# Patient Record
Sex: MALE | Race: OTHER | HISPANIC OR LATINO | Employment: FULL TIME | URBAN - METROPOLITAN AREA
[De-identification: names, ages, dates, MRNs, and addresses within clinical notes are randomized per-mention and may not be internally consistent; named-entity substitution may affect disease eponyms.]

---

## 2017-01-03 ENCOUNTER — NON-PROVIDER VISIT (OUTPATIENT)
Dept: OCCUPATIONAL MEDICINE | Facility: CLINIC | Age: 22
End: 2017-01-03

## 2017-01-03 DIAGNOSIS — Z11.1 ENCOUNTER FOR PPD TEST: ICD-10-CM

## 2017-01-03 PROCEDURE — 86580 TB INTRADERMAL TEST: CPT | Performed by: PREVENTIVE MEDICINE

## 2017-01-06 ENCOUNTER — NON-PROVIDER VISIT (OUTPATIENT)
Dept: OCCUPATIONAL MEDICINE | Facility: CLINIC | Age: 22
End: 2017-01-06

## 2017-01-06 DIAGNOSIS — Z11.1 ENCOUNTER FOR PPD SKIN TEST READING: ICD-10-CM

## 2017-01-06 LAB — TB WHEAL 3D P 5 TU DIAM: NORMAL MM

## 2017-01-11 ENCOUNTER — NON-PROVIDER VISIT (OUTPATIENT)
Dept: OCCUPATIONAL MEDICINE | Facility: CLINIC | Age: 22
End: 2017-01-11

## 2017-01-11 DIAGNOSIS — Z11.1 ENCOUNTER FOR PPD TEST: ICD-10-CM

## 2017-01-11 PROCEDURE — 86580 TB INTRADERMAL TEST: CPT | Performed by: PREVENTIVE MEDICINE

## 2017-01-13 ENCOUNTER — NON-PROVIDER VISIT (OUTPATIENT)
Dept: OCCUPATIONAL MEDICINE | Facility: CLINIC | Age: 22
End: 2017-01-13

## 2017-01-13 DIAGNOSIS — Z11.1 ENCOUNTER FOR PPD SKIN TEST READING: ICD-10-CM

## 2017-01-16 LAB — TB WHEAL 3D P 5 TU DIAM: NORMAL MM

## 2017-12-28 ENCOUNTER — HOSPITAL ENCOUNTER (OUTPATIENT)
Dept: RADIOLOGY | Facility: MEDICAL CENTER | Age: 22
End: 2017-12-28
Attending: ORTHOPAEDIC SURGERY
Payer: MEDICAID

## 2017-12-28 DIAGNOSIS — M25.511 RIGHT SHOULDER PAIN, UNSPECIFIED CHRONICITY: ICD-10-CM

## 2017-12-28 PROCEDURE — 77002 NEEDLE LOCALIZATION BY XRAY: CPT | Mod: RT

## 2017-12-28 PROCEDURE — 73222 MRI JOINT UPR EXTREM W/DYE: CPT | Mod: RT

## 2017-12-28 PROCEDURE — A9579 GAD-BASE MR CONTRAST NOS,1ML: HCPCS | Performed by: ORTHOPAEDIC SURGERY

## 2017-12-28 PROCEDURE — 700117 HCHG RX CONTRAST REV CODE 255: Performed by: ORTHOPAEDIC SURGERY

## 2017-12-28 RX ADMIN — IOHEXOL 5 ML: 300 INJECTION, SOLUTION INTRAVENOUS at 14:11

## 2017-12-28 RX ADMIN — GADODIAMIDE 5 ML: 287 INJECTION INTRAVENOUS at 14:11

## 2019-01-04 ENCOUNTER — NON-PROVIDER VISIT (OUTPATIENT)
Dept: URGENT CARE | Facility: PHYSICIAN GROUP | Age: 24
End: 2019-01-04

## 2019-01-04 DIAGNOSIS — Z02.1 PRE-EMPLOYMENT DRUG SCREENING: ICD-10-CM

## 2019-01-04 LAB
AMP AMPHETAMINE: NORMAL
COC COCAINE: NORMAL
INT CON NEG: NEGATIVE
INT CON POS: POSITIVE
MET METHAMPHETAMINES: NORMAL
OPI OPIATES: NORMAL
PCP PHENCYCLIDINE: NORMAL
POC DRUG COMMENT 753798-OCCUPATIONAL HEALTH: NORMAL
THC: NORMAL

## 2019-01-04 PROCEDURE — 80305 DRUG TEST PRSMV DIR OPT OBS: CPT | Performed by: EMERGENCY MEDICINE

## 2019-05-21 ENCOUNTER — OFFICE VISIT (OUTPATIENT)
Dept: URGENT CARE | Facility: CLINIC | Age: 24
End: 2019-05-21

## 2019-05-21 VITALS
HEIGHT: 66 IN | BODY MASS INDEX: 50.62 KG/M2 | WEIGHT: 315 LBS | DIASTOLIC BLOOD PRESSURE: 70 MMHG | TEMPERATURE: 98.8 F | HEART RATE: 72 BPM | SYSTOLIC BLOOD PRESSURE: 118 MMHG | OXYGEN SATURATION: 98 % | RESPIRATION RATE: 16 BRPM

## 2019-05-21 DIAGNOSIS — Z02.1 PHYSICAL EXAM, PRE-EMPLOYMENT: ICD-10-CM

## 2019-05-21 PROCEDURE — 8907 PR URINE COLLECT ONLY: Performed by: PHYSICIAN ASSISTANT

## 2019-05-21 PROCEDURE — 8915 PR COMPREHENSIVE PHYSICAL: Performed by: PHYSICIAN ASSISTANT

## 2019-08-06 ENCOUNTER — NON-PROVIDER VISIT (OUTPATIENT)
Dept: URGENT CARE | Facility: PHYSICIAN GROUP | Age: 24
End: 2019-08-06

## 2019-08-06 DIAGNOSIS — Z02.1 PRE-EMPLOYMENT DRUG SCREENING: ICD-10-CM

## 2019-08-06 LAB
AMP AMPHETAMINE: NORMAL
COC COCAINE: NORMAL
INT CON NEG: NORMAL
INT CON POS: NORMAL
MET METHAMPHETAMINES: NORMAL
OPI OPIATES: NORMAL
PCP PHENCYCLIDINE: NORMAL
POC DRUG COMMENT 753798-OCCUPATIONAL HEALTH: NEGATIVE
THC: NORMAL

## 2019-08-06 PROCEDURE — 80305 DRUG TEST PRSMV DIR OPT OBS: CPT | Performed by: NURSE PRACTITIONER

## 2019-09-19 ENCOUNTER — OFFICE VISIT (OUTPATIENT)
Dept: URGENT CARE | Facility: PHYSICIAN GROUP | Age: 24
End: 2019-09-19
Payer: COMMERCIAL

## 2019-09-19 ENCOUNTER — HOSPITAL ENCOUNTER (OUTPATIENT)
Dept: RADIOLOGY | Facility: MEDICAL CENTER | Age: 24
End: 2019-09-19
Attending: PHYSICIAN ASSISTANT
Payer: COMMERCIAL

## 2019-09-19 VITALS
HEART RATE: 84 BPM | TEMPERATURE: 97.8 F | OXYGEN SATURATION: 94 % | DIASTOLIC BLOOD PRESSURE: 80 MMHG | BODY MASS INDEX: 45.91 KG/M2 | RESPIRATION RATE: 12 BRPM | WEIGHT: 310 LBS | SYSTOLIC BLOOD PRESSURE: 132 MMHG | HEIGHT: 69 IN

## 2019-09-19 DIAGNOSIS — E66.01 MORBID OBESITY WITH BMI OF 45.0-49.9, ADULT (HCC): ICD-10-CM

## 2019-09-19 DIAGNOSIS — M25.572 CHRONIC PAIN OF LEFT ANKLE: ICD-10-CM

## 2019-09-19 DIAGNOSIS — M72.2 PLANTAR FASCIITIS: ICD-10-CM

## 2019-09-19 DIAGNOSIS — G89.29 CHRONIC PAIN OF LEFT ANKLE: ICD-10-CM

## 2019-09-19 PROCEDURE — 99214 OFFICE O/P EST MOD 30 MIN: CPT | Performed by: PHYSICIAN ASSISTANT

## 2019-09-19 PROCEDURE — 73610 X-RAY EXAM OF ANKLE: CPT | Mod: LT

## 2019-09-19 ASSESSMENT — ENCOUNTER SYMPTOMS
FALLS: 0
NAUSEA: 0
FEVER: 0
MUSCLE WEAKNESS: 0
INABILITY TO BEAR WEIGHT: 0
TINGLING: 0
LOSS OF SENSATION: 0
NUMBNESS: 0
CHILLS: 0
VOMITING: 0
LOSS OF MOTION: 0

## 2019-09-19 NOTE — PROGRESS NOTES
Subjective:   Rohan Overton is a 23 y.o. male who presents for Ankle Pain (been going for a year )        This is a new problem.  Patient complains of a right inner ankle pain x1.5 years.  Symptoms began after he was kicked directly in this area during a soccer game.  He states that initially symptoms improved, but never completely resolved.  He is also experiencing some pain on the bottom of his foot.  The pain is aching and does not radiate.  Work symptoms are worse in the morning and improves throughout the day, but then worsened into the evening.  He is taking ibuprofen regularly for symptoms and states this is providing him with significant relief.  He has never had this problem evaluated.  He continues to play soccer regularly. Denies personal hx of diabetes.    Ankle Pain    Incident onset: 1.5 years ago. The incident occurred at the gym (playing soccer). The injury mechanism was a direct blow. The pain is present in the left ankle (inside). The quality of the pain is described as aching. The pain is moderate. The pain has been fluctuating since onset. Pertinent negatives include no inability to bear weight, loss of motion, loss of sensation, muscle weakness, numbness or tingling. He reports no foreign bodies present. The symptoms are aggravated by movement and weight bearing. He has tried rest, ice, elevation and NSAIDs for the symptoms. The treatment provided moderate relief.     Review of Systems   Constitutional: Negative for chills and fever.   Gastrointestinal: Negative for nausea and vomiting.   Musculoskeletal: Positive for joint pain. Negative for falls.   Neurological: Negative for tingling and numbness.       PMH:  has a past medical history of ASTHMA.  MEDS:   Current Outpatient Medications:   •  benzonatate (TESSALON) 200 MG capsule, Take 200 mg by mouth 3 times a day as needed. Indications: Cough, Disp: , Rfl:   •  fluticasone (FLONASE) 50 MCG/ACT nasal spray, Spray 2 Sprays in nose every day.  "Each Nostril   Indications: Nonallergic Rhinitis, Disp: , Rfl:   •  ibuprofen (MOTRIN) 200 MG TABS, Take 200 mg by mouth every 6 hours as needed. Indications: Mild to Moderate Pain, Disp: , Rfl:   •  albuterol (VENTOLIN OR PROVENTIL) 108 (90 BASE) MCG/ACT AERS, Inhale 2 Puffs by mouth every 6 hours as needed. Indications: Asthma, Disp: , Rfl:   ALLERGIES: No Known Allergies  SURGHX: History reviewed. No pertinent surgical history.  SOCHX:  reports that he has never smoked. He has never used smokeless tobacco. He reports that he does not drink alcohol or use drugs.  FH: Family history was reviewed, no pertinent findings to report   Objective:   /80   Pulse 84   Temp 36.6 °C (97.8 °F) (Temporal)   Resp 12   Ht 1.753 m (5' 9\")   Wt (!) 140.6 kg (310 lb)   SpO2 94%   BMI 45.78 kg/m²   Physical Exam   Constitutional: He is oriented to person, place, and time. He appears well-developed and well-nourished.  Non-toxic appearance. No distress.   HENT:   Head: Normocephalic and atraumatic.   Right Ear: External ear normal.   Left Ear: External ear normal.   Nose: Nose normal.   Eyes: Conjunctivae and lids are normal.   Neck: Neck supple.   Cardiovascular: Normal rate and regular rhythm.   Pulmonary/Chest: Effort normal and breath sounds normal. No respiratory distress.   Abdominal: Normal appearance.   Musculoskeletal:   Left ankle/foot:  Appearance - No bruising, erythema, or deformity appreciated  Palpation -mildly tender over deltoid ligament and plantar fascia.  No TTP along medial malleolus. No TTP of lateral malleolus, ATFL, CFL, or PTFL.  No TTP along midfoot, base of the 5th metatarsal, MTP joints, or toes.   ROM - FROM throughout  Strength - 5/5 throughout  Special testing - Neg squeeze test, neg drawer test  Neurovascular - 2+ dorsalis pedis and posterior tibial.  Sensation intact and equal bilaterally       Neurological: He is alert and oriented to person, place, and time. No cranial nerve deficit or " sensory deficit.   Skin: Skin is warm, dry and intact. Capillary refill takes less than 2 seconds.   Psychiatric: He has a normal mood and affect. His speech is normal and behavior is normal. Judgment and thought content normal. Cognition and memory are normal.   Vitals reviewed.        Assessment/Plan:   1. Chronic pain of left ankle  - DX-ANKLE 3+ VIEWS LEFT; Future  - REFERRAL TO SPORTS MEDICINE    2. Plantar fasciitis  - REFERRAL TO SPORTS MEDICINE    3. Morbid obesity with BMI of 45.0-49.9, adult (HCC)    I suspect that some of patient's symptoms are related to plantar fasciitis however medial ankle pain likely due to different etiology.  Patient is also morbidly obese, further exacerbating symptoms.  I will obtain an x-ray to rule out bony abnormality.  Patient to follow-up with sports medicine for further evaluation and treatment as indicated.  I would like patient to roll out to the bottom of his foot on a lacrosse in the morning and in the evening.  Additionally I would like him to obtain inserts that will provide more arch support.  May continue to take ibuprofen as needed.    Differential diagnosis, natural history, supportive care, and indications for immediate follow-up discussed.

## 2021-12-27 ENCOUNTER — OFFICE VISIT (OUTPATIENT)
Dept: URGENT CARE | Facility: PHYSICIAN GROUP | Age: 26
End: 2021-12-27
Payer: OTHER GOVERNMENT

## 2021-12-27 ENCOUNTER — HOSPITAL ENCOUNTER (OUTPATIENT)
Facility: MEDICAL CENTER | Age: 26
End: 2021-12-27
Attending: PHYSICIAN ASSISTANT
Payer: OTHER GOVERNMENT

## 2021-12-27 VITALS
BODY MASS INDEX: 48.65 KG/M2 | TEMPERATURE: 99.2 F | HEART RATE: 100 BPM | RESPIRATION RATE: 20 BRPM | DIASTOLIC BLOOD PRESSURE: 80 MMHG | HEIGHT: 67 IN | OXYGEN SATURATION: 95 % | SYSTOLIC BLOOD PRESSURE: 150 MMHG | WEIGHT: 310 LBS

## 2021-12-27 DIAGNOSIS — Z20.822 CLOSE EXPOSURE TO COVID-19 VIRUS: ICD-10-CM

## 2021-12-27 DIAGNOSIS — Z20.822 SUSPECTED COVID-19 VIRUS INFECTION: ICD-10-CM

## 2021-12-27 DIAGNOSIS — R03.0 ELEVATED BLOOD PRESSURE READING: ICD-10-CM

## 2021-12-27 PROCEDURE — U0005 INFEC AGEN DETEC AMPLI PROBE: HCPCS

## 2021-12-27 PROCEDURE — 99213 OFFICE O/P EST LOW 20 MIN: CPT | Mod: CS | Performed by: PHYSICIAN ASSISTANT

## 2021-12-27 PROCEDURE — U0003 INFECTIOUS AGENT DETECTION BY NUCLEIC ACID (DNA OR RNA); SEVERE ACUTE RESPIRATORY SYNDROME CORONAVIRUS 2 (SARS-COV-2) (CORONAVIRUS DISEASE [COVID-19]), AMPLIFIED PROBE TECHNIQUE, MAKING USE OF HIGH THROUGHPUT TECHNOLOGIES AS DESCRIBED BY CMS-2020-01-R: HCPCS

## 2021-12-27 ASSESSMENT — ENCOUNTER SYMPTOMS
DIZZINESS: 0
CHILLS: 1
COUGH: 1
EYE REDNESS: 0
ABDOMINAL PAIN: 0
WHEEZING: 0
MYALGIAS: 1
HEADACHES: 1
VOMITING: 1
EYE DISCHARGE: 0
NECK PAIN: 0
NAUSEA: 1
FEVER: 0
DIARRHEA: 1
SORE THROAT: 1

## 2021-12-28 DIAGNOSIS — Z20.822 SUSPECTED COVID-19 VIRUS INFECTION: ICD-10-CM

## 2021-12-28 LAB — COVID ORDER STATUS COVID19: NORMAL

## 2021-12-28 NOTE — PROGRESS NOTES
"Subjective     Rohan Overton is a 26 y.o. male who presents with Nausea (vomiting, headache, fever, loss of [id number] )            Patient is a 26-year-old male who presents to urgent care with nausea, vomiting, body aches subjective fevers, headache and loss of smell that began yesterday.  Patient reports that his roommate tested positive last night for COVID-19 of which she has had significant exposure.  Patient is immunized to COVID x2.  Specifically denies any shortness of breath, difficulty breathing.  He does report a slight dry cough.  He has not taken anything for symptoms.  He denies any chest tightness as he does have history of asthma.    Nausea  Associated symptoms include chills, congestion, coughing, headaches, myalgias, nausea, a sore throat and vomiting. Pertinent negatives include no abdominal pain, fever, neck pain or rash.       Review of Systems   Constitutional: Positive for chills and malaise/fatigue. Negative for fever.   HENT: Positive for congestion and sore throat. Negative for ear discharge and ear pain.    Eyes: Negative for discharge and redness.   Respiratory: Positive for cough. Negative for wheezing.    Gastrointestinal: Positive for diarrhea, nausea and vomiting. Negative for abdominal pain.   Genitourinary: Negative for dysuria and urgency.   Musculoskeletal: Positive for myalgias. Negative for neck pain.   Skin: Negative for itching and rash.   Neurological: Positive for headaches. Negative for dizziness.              Objective     /80   Pulse 100   Temp 37.3 °C (99.2 °F) (Temporal)   Resp 20   Ht 1.702 m (5' 7\")   Wt (!) 141 kg (310 lb)   SpO2 95%   BMI 48.55 kg/m²    PMH:  has a past medical history of ASTHMA.  MEDS: Reviewed .   ALLERGIES: No Known Allergies  SURGHX: No past surgical history on file.  SOCHX:  reports that he has never smoked. He has never used smokeless tobacco. He reports current alcohol use. He reports that he does not use drugs.  FH: Family " history was reviewed, no pertinent findings to report    Physical Exam  Vitals reviewed.   Constitutional:       General: He is not in acute distress.     Appearance: He is well-developed.   HENT:      Head: Normocephalic and atraumatic.      Right Ear: External ear normal.      Left Ear: External ear normal.      Nose: No congestion.      Mouth/Throat:      Pharynx: No posterior oropharyngeal erythema.   Eyes:      Conjunctiva/sclera: Conjunctivae normal.      Pupils: Pupils are equal, round, and reactive to light.   Neck:      Trachea: No tracheal deviation.   Cardiovascular:      Rate and Rhythm: Normal rate and regular rhythm.   Pulmonary:      Effort: Pulmonary effort is normal.      Breath sounds: Normal breath sounds.   Musculoskeletal:         General: Normal range of motion.      Cervical back: Normal range of motion and neck supple.   Skin:     General: Skin is warm.      Findings: No rash.      Comments: No rash to area exposed during the visit today.    Neurological:      Mental Status: He is alert and oriented to person, place, and time.      Coordination: Coordination normal.   Psychiatric:         Behavior: Behavior normal.         Thought Content: Thought content normal.         Judgment: Judgment normal.                             Assessment & Plan        1. Suspected COVID-19 virus infection  COVID/SARS CoV-2 PCR   2. Close exposure to COVID-19 virus     3. Elevated blood pressure reading                 Appropriate PPE worn at all times by provider.   Pt. Had face mask on throughout entirety of the visit other than oropharyngeal examination today.   Patient is also with elevated blood pressure in clinic today-strongly encourage patient to follow-up with his PCP for further recheck in the future.    Testing performed for COVID-19.    Patient currently without indication of need for higher level of care.    Reviewed with patient/guardian that if they do test positive they will be contacted by their  local health department regarding return to work/school protocols.  Results will also be [facility]guardian [facility] or called to the patient/guardian directly. Discussed isolation/quarantine recommendations.  Encouraged mask use, frequent handwashing, wiping down hard surfaces, etc.    Patient and/or guardian given precautionary s/sx that mandate immediate follow up and evaluation in the ED. Advised of risks of not doing so.  Side effects of OTC or prescribed medications discussed.   DDX, Supportive care, and indications for immediate follow-up discussed.  Instructed to return to clinic or nearest emergency department if we are not available for any change in condition, further concerns, or worsening of symptoms.    The patient and/or guardian demonstrated a good understanding and agreed with the treatment plan.    Please note that this dictation was created using voice recognition software. I have made every reasonable attempt to correct obvious errors, but I expect that there are errors of grammar and possibly content that I did not discover before finalizing the note.

## 2021-12-29 LAB
SARS-COV-2 RNA RESP QL NAA+PROBE: DETECTED
SPECIMEN SOURCE: ABNORMAL

## 2025-01-06 ENCOUNTER — OFFICE VISIT (OUTPATIENT)
Dept: MEDICAL GROUP | Facility: PHYSICIAN GROUP | Age: 30
End: 2025-01-06
Payer: COMMERCIAL

## 2025-01-06 VITALS
OXYGEN SATURATION: 91 % | HEIGHT: 68 IN | DIASTOLIC BLOOD PRESSURE: 78 MMHG | SYSTOLIC BLOOD PRESSURE: 108 MMHG | WEIGHT: 315 LBS | BODY MASS INDEX: 47.74 KG/M2 | HEART RATE: 78 BPM

## 2025-01-06 DIAGNOSIS — G47.33 OSA (OBSTRUCTIVE SLEEP APNEA): ICD-10-CM

## 2025-01-06 DIAGNOSIS — F19.11 SUBSTANCE ABUSE IN REMISSION (HCC): ICD-10-CM

## 2025-01-06 DIAGNOSIS — Z76.89 ENCOUNTER TO ESTABLISH CARE: ICD-10-CM

## 2025-01-06 DIAGNOSIS — E66.01 MORBID OBESITY WITH BMI OF 50.0-59.9, ADULT (HCC): ICD-10-CM

## 2025-01-06 DIAGNOSIS — J45.20 MILD INTERMITTENT ASTHMA WITHOUT COMPLICATION: ICD-10-CM

## 2025-01-06 PROBLEM — G51.0 RIGHT-SIDED BELL'S PALSY: Status: ACTIVE | Noted: 2025-01-06

## 2025-01-06 PROBLEM — M72.2 PLANTAR FASCIITIS: Status: ACTIVE | Noted: 2025-01-06

## 2025-01-06 PROBLEM — Z86.69 HISTORY OF BELL'S PALSY: Status: ACTIVE | Noted: 2025-01-06

## 2025-01-06 PROCEDURE — 3074F SYST BP LT 130 MM HG: CPT | Performed by: STUDENT IN AN ORGANIZED HEALTH CARE EDUCATION/TRAINING PROGRAM

## 2025-01-06 PROCEDURE — 3078F DIAST BP <80 MM HG: CPT | Performed by: STUDENT IN AN ORGANIZED HEALTH CARE EDUCATION/TRAINING PROGRAM

## 2025-01-06 PROCEDURE — 99204 OFFICE O/P NEW MOD 45 MIN: CPT | Performed by: STUDENT IN AN ORGANIZED HEALTH CARE EDUCATION/TRAINING PROGRAM

## 2025-01-06 SDOH — ECONOMIC STABILITY: FOOD INSECURITY: WITHIN THE PAST 12 MONTHS, THE FOOD YOU BOUGHT JUST DIDN'T LAST AND YOU DIDN'T HAVE MONEY TO GET MORE.: NEVER TRUE

## 2025-01-06 SDOH — HEALTH STABILITY: PHYSICAL HEALTH: ON AVERAGE, HOW MANY DAYS PER WEEK DO YOU ENGAGE IN MODERATE TO STRENUOUS EXERCISE (LIKE A BRISK WALK)?: 3 DAYS

## 2025-01-06 SDOH — ECONOMIC STABILITY: INCOME INSECURITY: IN THE LAST 12 MONTHS, WAS THERE A TIME WHEN YOU WERE NOT ABLE TO PAY THE MORTGAGE OR RENT ON TIME?: NO

## 2025-01-06 SDOH — HEALTH STABILITY: PHYSICAL HEALTH: ON AVERAGE, HOW MANY MINUTES DO YOU ENGAGE IN EXERCISE AT THIS LEVEL?: 30 MIN

## 2025-01-06 SDOH — ECONOMIC STABILITY: INCOME INSECURITY: HOW HARD IS IT FOR YOU TO PAY FOR THE VERY BASICS LIKE FOOD, HOUSING, MEDICAL CARE, AND HEATING?: NOT VERY HARD

## 2025-01-06 SDOH — ECONOMIC STABILITY: HOUSING INSECURITY
IN THE LAST 12 MONTHS, WAS THERE A TIME WHEN YOU DID NOT HAVE A STEADY PLACE TO SLEEP OR SLEPT IN A SHELTER (INCLUDING NOW)?: NO

## 2025-01-06 SDOH — HEALTH STABILITY: MENTAL HEALTH
STRESS IS WHEN SOMEONE FEELS TENSE, NERVOUS, ANXIOUS, OR CAN'T SLEEP AT NIGHT BECAUSE THEIR MIND IS TROUBLED. HOW STRESSED ARE YOU?: TO SOME EXTENT

## 2025-01-06 SDOH — ECONOMIC STABILITY: FOOD INSECURITY: WITHIN THE PAST 12 MONTHS, YOU WORRIED THAT YOUR FOOD WOULD RUN OUT BEFORE YOU GOT MONEY TO BUY MORE.: SOMETIMES TRUE

## 2025-01-06 SDOH — ECONOMIC STABILITY: TRANSPORTATION INSECURITY
IN THE PAST 12 MONTHS, HAS LACK OF TRANSPORTATION KEPT YOU FROM MEETINGS, WORK, OR FROM GETTING THINGS NEEDED FOR DAILY LIVING?: NO

## 2025-01-06 SDOH — ECONOMIC STABILITY: TRANSPORTATION INSECURITY
IN THE PAST 12 MONTHS, HAS LACK OF RELIABLE TRANSPORTATION KEPT YOU FROM MEDICAL APPOINTMENTS, MEETINGS, WORK OR FROM GETTING THINGS NEEDED FOR DAILY LIVING?: NO

## 2025-01-06 SDOH — ECONOMIC STABILITY: TRANSPORTATION INSECURITY
IN THE PAST 12 MONTHS, HAS THE LACK OF TRANSPORTATION KEPT YOU FROM MEDICAL APPOINTMENTS OR FROM GETTING MEDICATIONS?: NO

## 2025-01-06 ASSESSMENT — LIFESTYLE VARIABLES
SKIP TO QUESTIONS 9-10: 0
HOW OFTEN DO YOU HAVE A DRINK CONTAINING ALCOHOL: 2-3 TIMES A WEEK
HOW OFTEN DO YOU HAVE SIX OR MORE DRINKS ON ONE OCCASION: WEEKLY
AUDIT-C TOTAL SCORE: 9
HOW MANY STANDARD DRINKS CONTAINING ALCOHOL DO YOU HAVE ON A TYPICAL DAY: 7 TO 9

## 2025-01-06 ASSESSMENT — SOCIAL DETERMINANTS OF HEALTH (SDOH)
DO YOU BELONG TO ANY CLUBS OR ORGANIZATIONS SUCH AS CHURCH GROUPS UNIONS, FRATERNAL OR ATHLETIC GROUPS, OR SCHOOL GROUPS?: NO
HOW OFTEN DO YOU ATTEND CHURCH OR RELIGIOUS SERVICES?: MORE THAN 4 TIMES PER YEAR
HOW MANY DRINKS CONTAINING ALCOHOL DO YOU HAVE ON A TYPICAL DAY WHEN YOU ARE DRINKING: 7 TO 9
HOW OFTEN DO YOU HAVE A DRINK CONTAINING ALCOHOL: 2-3 TIMES A WEEK
HOW OFTEN DO YOU GET TOGETHER WITH FRIENDS OR RELATIVES?: ONCE A WEEK
HOW OFTEN DO YOU ATTENT MEETINGS OF THE CLUB OR ORGANIZATION YOU BELONG TO?: NEVER
IN A TYPICAL WEEK, HOW MANY TIMES DO YOU TALK ON THE PHONE WITH FAMILY, FRIENDS, OR NEIGHBORS?: MORE THAN THREE TIMES A WEEK
IN THE PAST 12 MONTHS, HAS THE ELECTRIC, GAS, OIL, OR WATER COMPANY THREATENED TO SHUT OFF SERVICE IN YOUR HOME?: NO
DO YOU BELONG TO ANY CLUBS OR ORGANIZATIONS SUCH AS CHURCH GROUPS UNIONS, FRATERNAL OR ATHLETIC GROUPS, OR SCHOOL GROUPS?: NO
HOW OFTEN DO YOU GET TOGETHER WITH FRIENDS OR RELATIVES?: ONCE A WEEK
HOW HARD IS IT FOR YOU TO PAY FOR THE VERY BASICS LIKE FOOD, HOUSING, MEDICAL CARE, AND HEATING?: NOT VERY HARD
HOW OFTEN DO YOU ATTENT MEETINGS OF THE CLUB OR ORGANIZATION YOU BELONG TO?: NEVER
HOW OFTEN DO YOU HAVE SIX OR MORE DRINKS ON ONE OCCASION: WEEKLY
WITHIN THE PAST 12 MONTHS, YOU WORRIED THAT YOUR FOOD WOULD RUN OUT BEFORE YOU GOT THE MONEY TO BUY MORE: SOMETIMES TRUE
IN A TYPICAL WEEK, HOW MANY TIMES DO YOU TALK ON THE PHONE WITH FAMILY, FRIENDS, OR NEIGHBORS?: MORE THAN THREE TIMES A WEEK
HOW OFTEN DO YOU ATTEND CHURCH OR RELIGIOUS SERVICES?: MORE THAN 4 TIMES PER YEAR

## 2025-01-06 ASSESSMENT — PATIENT HEALTH QUESTIONNAIRE - PHQ9: CLINICAL INTERPRETATION OF PHQ2 SCORE: 0

## 2025-01-06 NOTE — PROGRESS NOTES
Subjective:     CC:  establish care    History of Present Illness  The patient presents to establish care. Prior PCP was Dr. Mason ([facility]).    He was previously under the care of Dr. Mason at [facility], with his last consultation occurring in May 2024. He has a history of asthma since childhood, which is currently managed with albuterol as needed. He experienced a recent asthma flare-up triggered by cold weather but managed it with albuterol. He has been experiencing a lung problem for the past 2 to 3 years, characterized by a persistent cough that lasts for 2 to 3 weeks and worsens when he is sick. He has a history of COVID-19 infection, having contracted the virus four times.    He has no surgical history. His last blood work was conducted approximately nine months ago, during which he was screened for diabetes and cholesterol levels. He has a known allergy to IBUPROFEN, which causes anaphylaxis.    He has a history of Bell's palsy, which occurred once in 2019, resulting in a complete droop of the right side of his face. He was diagnosed with Bell's palsy at the ER.    He has a history of weight struggles and has tried diets and protein shakes, which provided temporary relief. He tries to exercise regularly, including walking and lifting at work, and swimming and weightlifting at the gym once a week. He has a family history of obesity and is concerned about his weight and diabetes. He has been told that he snores, feels tired during the day, and his wife has witnessed him stop breathing while sleeping. He is requesting a sleep study.    STOPBANG - Sleep Apnea Screening      Flowsheet Row Most Recent Value   S - Have you been told that you SNORE? Yes   T - Are you often TIRED during the day? Yes   O - Do you know if you stop breathing or has anyone witnessed you stop breathing while you were asleep? (OBSTRUCTION) Yes   P - Do you have high blood PRESSURE or on medication to control high blood  pressure? No   B - Is your Body Mass Index greater than 35? (BMI) Yes   A - Are you 50 years old or older? (AGE) No   N - Are you a male with a NECK circumference greater than 17 inches, or a female with a neck circumference greater than 16 inches? Yes  [20 inches]   G - Are you male? (GENDER) Yes   STOPBANG Total Score 6   DEACON Risk High Risk          Supplemental Information  His most recent consultation with Dr. Mason was for foot pain, which was diagnosed as plantar fasciitis. He was advised that the condition would resolve spontaneously and was recommended to use inserts, which he found ineffective. He was also advised to perform exercises using a bottle. The foot pain has since subsided.    SOCIAL HISTORY  The patient vapes nicotine some days. He quit smoking cigarettes in 2018 after smoking nearly 2 packs a day for about 2 years. He consumes alcohol on the weekends, drinking either 24 cans of beer or eight shots of liquor from Friday to Sunday. He has used cocaine and weed in the past, with the last use being 4 to 5 years ago. He reports no i.v. drug use. He is currently sexually active and . He works as a . He has no children. He has completed some college education. He lives with his parents and wife. He has a dog.    FAMILY HISTORY  The patient's mother and father have diabetes. His mother also has high blood pressure. His maternal grandmother and paternal grandfather have high blood pressure. The patient reports a family history of Bell's palsy. There is no family history of cancer, strokes, heart disease, heart attacks, or stents.    ALLERGIES  The patient is allergic to IBUPROFEN, resulting in anaphylaxis.    MEDICATIONS  Current: albuterol    IMMUNIZATIONS  The patient declined the influenza vaccine.      Health Maintenance: Completed    Allergies   Allergen Reactions    Ibuprofen Anaphylaxis, Hives, Rash, Shortness of Breath and Swelling     Patient Active Problem  List   Diagnosis    Morbid obesity with BMI of 50.0-59.9, adult (HCC)    Mild intermittent asthma without complication    History of Bell's palsy    Substance abuse in remission ([facility])    Plantar fasciitis     Current Outpatient Medications   Medication Sig Dispense Refill    albuterol (VENTOLIN OR PROVENTIL) 108 (90 BASE) MCG/ACT AERS Inhale 2 Puffs by mouth every 6 hours as needed. Indications: Asthma       No current facility-administered medications for this visit.     History reviewed. No pertinent surgical history.   Social History     Socioeconomic History    Marital status:      Spouse name: Not on file    Number of children: 0    Years of education: Not on file    Highest education level: Some college, no degree   Occupational History    Occupation:  [employer]   Tobacco Use    Smoking status: Former     Types: Cigarettes    Smokeless tobacco: Never    Tobacco comments:     2 ppd for 2 years, quit 2018   Vaping Use    Vaping status: Some Days    Substances: Nicotine   Substance and Sexual Activity    Alcohol use: Yes     Alcohol/week: 19.2 oz     Types: 24 Cans of beer, 8 Shots of liquor per week     Comment: weekend use    Drug use: Not Currently     Types: Cocaine, Marijuana     Comment: last use 5 years ago    Sexual activity: Yes     Partners: Female     Birth control/protection: I.U.D.   Other Topics Concern    Not on file   Social History Narrative    Lives with wife and parents, 1 dog.     Social Drivers of Health     Financial Resource Strain: Low Risk  (1/6/2025)    Overall Financial Resource Strain (CARDIA)     Difficulty of Paying Living Expenses: Not very hard   Food Insecurity: Food Insecurity Present (1/6/2025)    Hunger Vital Sign     Worried About Running Out of Food in the Last Year: Sometimes true     Ran Out of Food in the Last Year: Never true   Transportation Needs: No Transportation Needs (1/6/2025)    PRAPARE - Transportation     Lack of  Transportation (Medical): No     Lack of Transportation (Non-Medical): No   Physical Activity: Insufficiently Active (1/6/2025)    Exercise Vital Sign     Days of Exercise per Week: 3 days     Minutes of Exercise per Session: 30 min   Stress: Stress Concern Present (1/6/2025)    [employer] [place] [employer] - Occupational Stress Questionnaire     Feeling of Stress : To some extent   Social Connections: Moderately Integrated (1/6/2025)    Social Connection and Isolation Panel [NHANES]     Frequency of Communication with Friends and Family: More than three times a week     Frequency of Social Gatherings with Friends and Family: Once a week     Attends [employer] Services: More than 4 times per year     Active Member of Clubs or Organizations: No     Attends Club or Organization Meetings: Never     Marital Status:    Intimate Partner Violence: Not on file   Housing Stability: Low Risk  (1/6/2025)    Housing Stability Vital Sign     Unable to Pay for Housing in the Last Year: No     Number of Times Moved in the Last Year: 0     Homeless in the Last Year: No     Family History   Problem Relation Age of Onset    Diabetes Mother     Hypertension Mother     Diabetes Father     Diabetes Maternal Grandmother     Hypertension Paternal Grandfather     Diabetes Paternal Grandfather     Cancer Neg Hx     Ovarian Cancer Neg Hx     Tubal Cancer Neg Hx     Peritoneal Cancer Neg Hx     Colorectal Cancer Neg Hx     Breast Cancer Neg Hx     Stroke Neg Hx     Heart Disease Neg Hx          ROS:     Constitutional:  Negative for chills, fever, fatigue, weight loss.  HEENT:  Negative for blurred vision, hearing loss, sore throat.    Respiratory:  Negative for cough, sputum production and shortness of breath.  Cardiovascular:  Negative for chest pain, palpitations and leg swelling.  Gastrointestinal:  Negative for abdominal pain, blood in stool, constipation, diarrhea and vomiting.   Musculoskeletal:  Negative for back  "pain, falls, joint pain and neck pain.   Skin:  Negative for rash.   Neurological:  Negative for dizziness, seizures, weakness and headaches.   Endo/Heme/Allergies:  Does not bruise/bleed easily.   Psychiatric/Behavioral:  Negative for depression, anxiety and suicidal thoughts.      Objective:     Exam: /78 (BP Location: Left arm, Patient Position: Sitting, BP Cuff Size: Large adult)   Pulse 78   Ht 1.727 m (5' 8\")   Wt (!) 172 kg (378 lb 8.5 oz)   SpO2 91%  Body mass index is 57.56 kg/m².    Gen: Alert and oriented, no acute distress.  Eyes:  PERRL, conjunctivae clear, lids normal.   ENMT: Lips without lesions, good dentition, moist mucous membranes.  Neck: Neck is supple, trachea middle, no thyromegaly.  Lungs: Normal effort, CTAB, no wheezing / rhonchi / rales.  CV: RRR, normal S1 and S2, no murmurs.  GI:  Abdomen soft, non-tender, non-distended with normal bowel sounds.  MSK:  Normal ROM.  Ext: No clubbing, cyanosis, or edema.  Skin:  Warm and dry with no rashes or lesions.  Neuro: AAO x 3, no acute focal deficits.  Psych: Normal affect and mood.      Assessment & Plan:   29 y.o. male with the following -    1. Encounter to establish care  Patient presents today to establish care.  Chart was reviewed and history was discussed in detail with the patient.  Records requested from previous PCP.  He reports labs were last done 9 months ago including screening for diabetes and cholesterol.  Annual labs will be ordered accordingly after records are obtained.    2. Mild intermittent asthma without complication  Chronic, stable.  Episodes of cough that lasted a couple weeks are most likely due to viral illness.  Continue albuterol as needed.    3. DEACON (obstructive sleep apnea)  Chronic, uncontrolled.  STOP-BANG score 6 (high risk) and sleep study ordered.  - Overnight Home Sleep Study; Future    4. Morbid obesity with BMI of 50.0-59.9, adult (HCC)  Chronic, uncontrolled.  BMI 57.56 today.  He reports labs were " done in the past including screening for diabetes and cholesterol which were normal.  He most likely has sleep apnea and sleep study ordered.  We discussed the importance of healthy diet and 150 minutes of exercise weekly.  He will inquire with insurance regarding coverage of weight loss medication.  We discussed bariatric surgery but he declined at this time.  - Overnight Home Sleep Study; Future    5. Substance abuse in remission (HCC)  Chronic, in remission.  Last cocaine use was 4-5 years ago and encouraged to continue abstaining from illicit drug use.        I spent a total of 30 minutes with record review, exam, communication with the patient, communication with other providers, and documentation of this encounter.    Return in about 3 months (around 4/6/2025) for Follow-up of chronic conditions.    Verbal consent was acquired by the patient to use Headspace ambient listening note generation during this visit: Yes.    Please note that this dictation was created using voice recognition software. I have made every reasonable attempt to correct obvious errors, but I expect that there are errors of grammar and possibly content that I did not discover before finalizing the note.

## 2025-02-06 ENCOUNTER — TELEPHONE (OUTPATIENT)
Dept: HEALTH INFORMATION MANAGEMENT | Facility: OTHER | Age: 30
End: 2025-02-06
Payer: COMMERCIAL

## 2025-02-14 ENCOUNTER — HOME STUDY (OUTPATIENT)
Dept: SLEEP MEDICINE | Facility: MEDICAL CENTER | Age: 30
End: 2025-02-14
Attending: STUDENT IN AN ORGANIZED HEALTH CARE EDUCATION/TRAINING PROGRAM
Payer: COMMERCIAL

## 2025-02-14 DIAGNOSIS — E66.01 MORBID OBESITY WITH BMI OF 50.0-59.9, ADULT (HCC): ICD-10-CM

## 2025-02-14 DIAGNOSIS — G47.33 OSA (OBSTRUCTIVE SLEEP APNEA): ICD-10-CM

## 2025-02-15 PROCEDURE — 95800 SLP STDY UNATTENDED: CPT | Performed by: STUDENT IN AN ORGANIZED HEALTH CARE EDUCATION/TRAINING PROGRAM

## 2025-02-24 NOTE — PROCEDURES
DIAGNOSTIC HOME SLEEP TEST (HST) REPORT WatchPAT      PATIENT ID:  NAME:  Rohan Overton  MRN:               [id number]  YOB: 1995  DATE OF STUDY: 02/15/2025      Impression:     This study shows evidence of:      1. Moderate obstructive sleep apnea with PAT apnea hypopnea index(pAHI) of 25.9 per hour.  PAT respiratory disturbance index (pRDI) was 26.5 per hour. These findings are based on 7 channels recording of PAT signal with sleep staging, heart rate, pulse oximetry, actigraphy, body position, snoring and respiratory movement.     2. Oxygenation O2 Sat. mean O2 sat was 89%,  anupam was 77%,  and maximum O2 at 97 %. O2 sat was at or  below 88% for 36.7 min of evaluation time. Oxygen Desaturation (>=4%) Index was 16.9/hr. AVG HR was 78 BPM.      TECHNICAL DESCRIPTION: Patient underwent home sleep apnea testing with peripheral arterial tone signal (WatchPAT™). This is a Type IV portable monitor and device per Medicare. Monitoring was done with 7 channels recording of PAT signal with sleep staging, heart rate, pulse oximetry, actigraphy, body position, snoring and respiratory movement. Prior to using the device, the patient received verbal and written instructions for its application and was provided with the help desk phone number for additional telephonic instruction with 24-hour availability of qualified personnel to answer questions.    Respiratory events:        General sleep summary: . Total recording time is 6 hours and 4 minutes and total Sleep time is 5 hours and 7 minutes. The patient spent 187 minutes in the supine position and 120 minutes in the nonsupine position.    Clustering of respiratory events and desaturation seen.    Recommendations:    1. CPAP titration study vs Auto CPAP trial.  If starting auto CPAP recommend minimum pressure of 5 cmH2O maximum pressure 15 cmH2O    2. In general patients with sleep apnea are advised to avoid alcohol and sedatives and to not operate a  motor vehicle while drowsy. In some cases alternative treatment options may prove effective in resolving sleep apnea in these options include upper airway surgery, the use of a dental orthotic or weight loss and positional therapy. Clinical correlation is required.

## 2025-02-27 ENCOUNTER — RESULTS FOLLOW-UP (OUTPATIENT)
Dept: MEDICAL GROUP | Facility: PHYSICIAN GROUP | Age: 30
End: 2025-02-27

## 2025-02-27 DIAGNOSIS — G47.33 OSA (OBSTRUCTIVE SLEEP APNEA): ICD-10-CM

## 2025-03-10 PROBLEM — R16.0 HEPATOMEGALY: Status: ACTIVE | Noted: 2025-03-10

## 2025-03-13 DIAGNOSIS — E66.01 MORBID OBESITY WITH BMI OF 50.0-59.9, ADULT (HCC): ICD-10-CM

## 2025-04-10 ENCOUNTER — APPOINTMENT (OUTPATIENT)
Dept: SLEEP MEDICINE | Facility: MEDICAL CENTER | Age: 30
End: 2025-04-10
Payer: COMMERCIAL

## 2025-07-01 ENCOUNTER — APPOINTMENT (OUTPATIENT)
Dept: SLEEP MEDICINE | Facility: MEDICAL CENTER | Age: 30
End: 2025-07-01
Payer: COMMERCIAL

## 2025-08-14 ENCOUNTER — APPOINTMENT (OUTPATIENT)
Dept: MEDICAL GROUP | Facility: PHYSICIAN GROUP | Age: 30
End: 2025-08-14
Payer: COMMERCIAL

## 2025-10-30 ENCOUNTER — APPOINTMENT (OUTPATIENT)
Dept: MEDICAL GROUP | Facility: PHYSICIAN GROUP | Age: 30
End: 2025-10-30
Payer: COMMERCIAL